# Patient Record
Sex: MALE | Race: WHITE | NOT HISPANIC OR LATINO
[De-identification: names, ages, dates, MRNs, and addresses within clinical notes are randomized per-mention and may not be internally consistent; named-entity substitution may affect disease eponyms.]

---

## 2023-06-19 PROBLEM — Z00.00 ENCOUNTER FOR PREVENTIVE HEALTH EXAMINATION: Status: ACTIVE | Noted: 2023-06-19

## 2023-07-03 ENCOUNTER — APPOINTMENT (OUTPATIENT)
Dept: BREAST CENTER | Facility: CLINIC | Age: 53
End: 2023-07-03
Payer: OTHER GOVERNMENT

## 2023-07-03 VITALS
DIASTOLIC BLOOD PRESSURE: 81 MMHG | HEIGHT: 69 IN | WEIGHT: 215 LBS | HEART RATE: 80 BPM | SYSTOLIC BLOOD PRESSURE: 137 MMHG | BODY MASS INDEX: 31.84 KG/M2

## 2023-07-03 DIAGNOSIS — N63.10 UNSPECIFIED LUMP IN THE RIGHT BREAST, UNSPECIFIED QUADRANT: ICD-10-CM

## 2023-07-03 DIAGNOSIS — M19.90 UNSPECIFIED OSTEOARTHRITIS, UNSPECIFIED SITE: ICD-10-CM

## 2023-07-03 DIAGNOSIS — F17.200 NICOTINE DEPENDENCE, UNSPECIFIED, UNCOMPLICATED: ICD-10-CM

## 2023-07-03 DIAGNOSIS — F10.20 ALCOHOL DEPENDENCE, UNCOMPLICATED: ICD-10-CM

## 2023-07-03 DIAGNOSIS — Z78.9 OTHER SPECIFIED HEALTH STATUS: ICD-10-CM

## 2023-07-03 DIAGNOSIS — Z63.5 DISRUPTION OF FAMILY BY SEPARATION AND DIVORCE: ICD-10-CM

## 2023-07-03 DIAGNOSIS — G47.30 SLEEP APNEA, UNSPECIFIED: ICD-10-CM

## 2023-07-03 DIAGNOSIS — Z80.1 FAMILY HISTORY OF MALIGNANT NEOPLASM OF TRACHEA, BRONCHUS AND LUNG: ICD-10-CM

## 2023-07-03 DIAGNOSIS — Z82.49 FAMILY HISTORY OF ISCHEMIC HEART DISEASE AND OTHER DISEASES OF THE CIRCULATORY SYSTEM: ICD-10-CM

## 2023-07-03 PROCEDURE — 99204 OFFICE O/P NEW MOD 45 MIN: CPT

## 2023-07-03 PROCEDURE — 76642 ULTRASOUND BREAST LIMITED: CPT

## 2023-07-03 SDOH — SOCIAL STABILITY - SOCIAL INSECURITY: DISRUPTION OF FAMILY BY SEPARATION AND DIVORCE: Z63.5

## 2023-07-04 PROBLEM — N63.10 LUMP OF BREAST, RIGHT: Status: ACTIVE | Noted: 2023-07-04

## 2023-07-04 PROBLEM — Z82.49 FAMILY HISTORY OF HEART ATTACK: Status: ACTIVE | Noted: 2023-07-03

## 2023-07-04 PROBLEM — M19.90 ARTHRITIS: Status: ACTIVE | Noted: 2023-07-04

## 2023-07-04 PROBLEM — Z78.9 CAFFEINE USE: Status: ACTIVE | Noted: 2023-07-03

## 2023-07-04 PROBLEM — Z63.5 DIVORCED: Status: ACTIVE | Noted: 2023-07-03

## 2023-07-04 PROBLEM — F17.200 CURRENT SMOKER: Status: ACTIVE | Noted: 2023-07-03

## 2023-07-04 PROBLEM — F10.20 ALCOHOLISM: Status: ACTIVE | Noted: 2023-07-04

## 2023-07-04 PROBLEM — Z80.1 FAMILY HISTORY OF LUNG CANCER: Status: ACTIVE | Noted: 2023-07-04

## 2023-07-04 PROBLEM — G47.30 SLEEP APNEA: Status: ACTIVE | Noted: 2023-07-04

## 2023-07-04 RX ORDER — NALOXONE HYDROCHLORIDE 4 MG/.1ML
4 SPRAY NASAL
Refills: 0 | Status: ACTIVE | COMMUNITY

## 2023-07-04 RX ORDER — VITAMIN D3/FOLIC ACID 95 MCG-1MG
TABLET ORAL
Refills: 0 | Status: ACTIVE | COMMUNITY

## 2023-07-04 RX ORDER — NALTREXONE HYDROCHLORIDE 50 MG/1
TABLET, FILM COATED ORAL
Refills: 0 | Status: ACTIVE | COMMUNITY

## 2023-07-04 RX ORDER — ATORVASTATIN CALCIUM 80 MG/1
TABLET, FILM COATED ORAL
Refills: 0 | Status: ACTIVE | COMMUNITY

## 2023-07-04 NOTE — DATA REVIEWED
[FreeTextEntry1] : I have independently reviewed the reports and the images. \par \par FNA cultures 3/31/23\par - Corynebacterium\par \par B/l mammogram 4/17/23\par - R gynecomastia at area of concern\par - BIRADS 2\par \par B/l US 4/17/23\par - R gynecomastia at area of concern; [also complex fluid in R subareolar region]\par - BIRADS 2

## 2023-07-04 NOTE — CONSULT LETTER
[Dear  ___] : Dear  [unfilled], [Consult Letter:] : I had the pleasure of evaluating your patient, [unfilled]. [Please see my note below.] : Please see my note below. [Consult Closing:] : Thank you very much for allowing me to participate in the care of this patient.  If you have any questions, please do not hesitate to contact me. [Sincerely,] : Sincerely, [FreeTextEntry3] : Ade Russell MD FACS

## 2023-07-04 NOTE — HISTORY OF PRESENT ILLNESS
[FreeTextEntry1] : Mr. Bautista is a 52 year old man here for a consultation for a lump on the right breast. Since 2015, he would develop a white-head lesion on the right medial areola about 1-2 times a year; it was the size of a large pimple, was not red or painful, and when he squeezed it, a white toothpaste-like material would be extruded. In March this year, he had redness, pain, and swelling by the right medial areola. FNA yielded very scant drainage, and with doxycycline treatment it has resolved. No other lumps. No nipple discharge.\par \par His family history is not significant for any breast cancer.

## 2023-07-04 NOTE — PROCEDURE
[FreeTextEntry1] : Targeted R breast US  [FreeTextEntry2] : History of R breast lump [FreeTextEntry3] : The patient was placed in a supine position, and the right medial areola was scanned in both transverse and sagittal orientations. No mass or abscess is seen. There is a suggestion of a sliver of fluid within the dermal layer. My impression is that this is benign, likely the residua of a sebaceous cyst, BIRADS 2, and observation is recommended.

## 2023-07-04 NOTE — PHYSICAL EXAM
[Normocephalic] : normocephalic [Atraumatic] : atraumatic [Supple] : supple [Sclera nonicteric] : sclera nonicteric [No Supraclavicular Adenopathy] : no supraclavicular adenopathy [No Cervical Adenopathy] : no cervical adenopathy [Normal Sinus Rhythm] : normal sinus rhythm [Clear to Auscultation Bilat] : clear to auscultation bilaterally [Examined in the supine and seated position] : examined in the supine and seated position [No dominant masses] : no dominant masses in right breast  [No dominant masses] : no dominant masses left breast [No Nipple Retraction] : no left nipple retraction [No Nipple Discharge] : no left nipple discharge [No Axillary Lymphadenopathy] : no left axillary lymphadenopathy [Soft] : abdomen soft [No Edema] : no edema [No Rashes] : no rashes [No Ulceration] : no ulceration [de-identified] : No lump palpable. No shipman visible.